# Patient Record
Sex: MALE | Race: WHITE | NOT HISPANIC OR LATINO | Employment: OTHER | URBAN - METROPOLITAN AREA
[De-identification: names, ages, dates, MRNs, and addresses within clinical notes are randomized per-mention and may not be internally consistent; named-entity substitution may affect disease eponyms.]

---

## 2017-10-20 NOTE — PATIENT DISCUSSION
POAG, OU: INTRAOCULAR PRESSURE IS WITHIN ACCEPTABLE LIMITS. PT INSTRUCTED TO CONTINUE TIMOLOL BID AND RETURN FOR FOLLOW-UP AS SCHEDULED.

## 2020-06-26 ENCOUNTER — NEW PATIENT EMERGENCY (OUTPATIENT)
Dept: URBAN - METROPOLITAN AREA CLINIC 38 | Facility: CLINIC | Age: 60
End: 2020-06-26

## 2020-06-26 DIAGNOSIS — H01.115: ICD-10-CM

## 2020-06-26 DIAGNOSIS — H01.112: ICD-10-CM

## 2020-06-26 PROCEDURE — 99203 OFFICE O/P NEW LOW 30 MIN: CPT

## 2020-06-26 ASSESSMENT — VISUAL ACUITY
OD_CC: 20/20
OS_CC: 20/20

## 2020-06-26 ASSESSMENT — TONOMETRY
OS_IOP_MMHG: 16
OD_IOP_MMHG: 15

## 2020-07-02 ENCOUNTER — ESTABLISHED COMPREHENSIVE EXAM (OUTPATIENT)
Dept: URBAN - METROPOLITAN AREA CLINIC 38 | Facility: CLINIC | Age: 60
End: 2020-07-02

## 2020-07-02 DIAGNOSIS — H04.123: ICD-10-CM

## 2020-07-02 PROCEDURE — 92014 COMPRE OPH EXAM EST PT 1/>: CPT

## 2020-07-02 PROCEDURE — 92015 DETERMINE REFRACTIVE STATE: CPT

## 2020-07-02 ASSESSMENT — VISUAL ACUITY
OD_SC: 20/20
OS_CC: J1
OD_CC: J1
OD_SC: J8
OS_SC: J2
OD_CC: 20/20
OS_SC: 20/200
OS_CC: 20/20

## 2020-07-02 ASSESSMENT — TONOMETRY
OD_IOP_MMHG: 15
OS_IOP_MMHG: 16

## 2022-02-11 ENCOUNTER — COMPREHENSIVE EXAM (OUTPATIENT)
Dept: URBAN - METROPOLITAN AREA CLINIC 35 | Facility: CLINIC | Age: 62
End: 2022-02-11

## 2022-02-11 DIAGNOSIS — H52.13: ICD-10-CM

## 2022-02-11 DIAGNOSIS — H04.123: ICD-10-CM

## 2022-02-11 PROCEDURE — 92015 DETERMINE REFRACTIVE STATE: CPT

## 2022-02-11 PROCEDURE — 92014 COMPRE OPH EXAM EST PT 1/>: CPT

## 2022-02-11 ASSESSMENT — VISUAL ACUITY
OD_SC: J10
OS_SC: 20/400
OD_SC: 20/25
OS_SC: J1

## 2022-02-11 ASSESSMENT — TONOMETRY
OD_IOP_MMHG: 16
OS_IOP_MMHG: 15

## 2023-08-01 NOTE — PATIENT DISCUSSION
COUNSELING:
General:
Medications:
New Prescription: Tomasgan P (brimonidine): drops: 0.1% 1 drop twice a day into both eyes 03-
POAG, OU Counseling: I have reviewed the regimen of glaucoma drops with the patient and have stressed the importance of compliance. Patient instructed to continue present medication and return for follow-up as scheduled.
POAG, OU: INTRAOCULAR PRESSURE IS WITHIN ACCEPTABLE LIMITS. PT INSTRUCTED TO CONTINUE Alphagan P BID OU AND RETURN FOR FOLLOW-UP AS SCHEDULED.
reduced VA OU:  towards end of exam pt reports that pt heart was racing and has been difibrilated in the past.  pt also notes that recently went to er for heart racing and was given new medication. Pt on several meds and possible that intermittant blood pressure changes might be at fault for vision changes and perhaps for glaucoma in general.  Pt educated we need to work on compliance with testing so that we have a better timeline to track her health history and when she starts and stops certain meds. possible side effect of amiodarone.   pt also educated she needs to followup with cardiology and pcp about medication changes through ER
Render Risk Assessment In Note?: no
Detail Level: Simple
Additional Notes: We discussed non steroid cream (Tacrolimus ). Pt mom can called for prescription